# Patient Record
Sex: MALE | Race: WHITE | NOT HISPANIC OR LATINO | Employment: UNEMPLOYED | URBAN - METROPOLITAN AREA
[De-identification: names, ages, dates, MRNs, and addresses within clinical notes are randomized per-mention and may not be internally consistent; named-entity substitution may affect disease eponyms.]

---

## 2019-02-07 ENCOUNTER — OFFICE VISIT (OUTPATIENT)
Dept: FAMILY MEDICINE CLINIC | Facility: CLINIC | Age: 19
End: 2019-02-07

## 2019-02-07 VITALS
OXYGEN SATURATION: 98 % | DIASTOLIC BLOOD PRESSURE: 82 MMHG | HEIGHT: 74 IN | BODY MASS INDEX: 36.06 KG/M2 | HEART RATE: 82 BPM | SYSTOLIC BLOOD PRESSURE: 130 MMHG | WEIGHT: 281 LBS

## 2019-02-07 DIAGNOSIS — Z23 NEED FOR VACCINATION: ICD-10-CM

## 2019-02-07 DIAGNOSIS — Z00.00 PHYSICAL EXAM: Primary | ICD-10-CM

## 2019-02-07 PROCEDURE — 90715 TDAP VACCINE 7 YRS/> IM: CPT | Performed by: NURSE PRACTITIONER

## 2019-02-07 PROCEDURE — 90471 IMMUNIZATION ADMIN: CPT | Performed by: NURSE PRACTITIONER

## 2019-02-07 PROCEDURE — 99385 PREV VISIT NEW AGE 18-39: CPT | Performed by: NURSE PRACTITIONER

## 2019-02-07 RX ORDER — ALBUTEROL SULFATE 90 UG/1
2 AEROSOL, METERED RESPIRATORY (INHALATION)
COMMUNITY
Start: 2019-01-24 | End: 2019-03-11 | Stop reason: SDUPTHER

## 2019-02-07 RX ORDER — FLUTICASONE PROPIONATE 44 UG/1
AEROSOL, METERED RESPIRATORY (INHALATION)
COMMUNITY
End: 2020-08-31 | Stop reason: SDUPTHER

## 2019-02-07 NOTE — PROGRESS NOTES
"Grady Desai is a 19 y.o. male.  PMHX: asthma  PSHX:None  PFHX: none  Diet is mediocre  Exercises 3 times a week  Has a puppy   Sleep hygiene is good  Employed at the [employer]    And is having a baby in 4 mos        Assessment/Plan   Problem List Items Addressed This Visit     None      Visit Diagnoses     Physical exam    -  Primary    Need for vaccination        Relevant Orders    Tdap Vaccine Greater Than or Equal To 6yo IM (Completed)             No Follow-up on file.  There are no Patient Instructions on file for this visit.    Chief Complaint   Patient presents with   • Annual Exam     Social History     Tobacco Use   • Smoking status: Never Smoker   • Smokeless tobacco: Never Used   Substance Use Topics   • Alcohol use: Yes     Comment: occ   • Drug use: No       History of Present Illness     The following portions of the patient's history were reviewed and updated as appropriate:PMHroutine: Social history , Past Medical History, Surgical history , Allergies, Current Medications, Active Problem List, Family History and Health Maintenance    Review of Systems   Constitutional: Negative for activity change and appetite change.   HENT: Negative for congestion.    Respiratory: Negative for cough.    Neurological: Negative for dizziness and headaches.       Objective   Vitals:    02/07/19 0951   BP: 130/82   Pulse: 82   SpO2: 98%   Weight: 127 kg (281 lb)   Height: 188 cm (74\")     Body mass index is 36.08 kg/m².  Physical Exam   Constitutional: He appears well-developed and well-nourished. No distress.   HENT:   Head: Normocephalic and atraumatic.   Right Ear: External ear normal.   Left Ear: External ear normal.   Mouth/Throat: Oropharynx is clear and moist.   Eyes: EOM are normal.   Neck: Neck supple.   Cardiovascular: Normal rate and regular rhythm.   Pulmonary/Chest: Effort normal and breath sounds normal.   Abdominal: Soft. Bowel sounds are normal.   Musculoskeletal: Normal range of motion. "   Neurological: He is alert.   Skin: Skin is warm.   Nursing note and vitals reviewed.    Reviewed Data:  No results found for any previous visit.

## 2019-03-11 ENCOUNTER — TELEPHONE (OUTPATIENT)
Dept: FAMILY MEDICINE CLINIC | Facility: CLINIC | Age: 19
End: 2019-03-11

## 2019-03-11 RX ORDER — ALBUTEROL SULFATE 90 UG/1
2 AEROSOL, METERED RESPIRATORY (INHALATION) EVERY 6 HOURS PRN
Qty: 1 INHALER | Refills: 1 | Status: SHIPPED | OUTPATIENT
Start: 2019-03-11 | End: 2019-05-01 | Stop reason: SDUPTHER

## 2019-03-11 NOTE — TELEPHONE ENCOUNTER
----- Message from Louise Crane sent at 3/11/2019  8:35 AM EDT -----  Patient requesting refill on ProAir called to [facility] [phone]

## 2019-05-01 RX ORDER — ALBUTEROL SULFATE 90 UG/1
2 AEROSOL, METERED RESPIRATORY (INHALATION) EVERY 6 HOURS PRN
Qty: 1 INHALER | Refills: 5 | Status: SHIPPED | OUTPATIENT
Start: 2019-05-01 | End: 2019-07-01 | Stop reason: SDUPTHER

## 2019-05-07 ENCOUNTER — TELEPHONE (OUTPATIENT)
Dept: FAMILY MEDICINE CLINIC | Facility: CLINIC | Age: 19
End: 2019-05-07

## 2019-05-07 NOTE — TELEPHONE ENCOUNTER
----- Message from Louise Crane sent at 5/7/2019 10:05 AM EDT -----  Patient called requesting refill on Pro air Inhaler called to [facility] [phone]

## 2019-07-01 RX ORDER — ALBUTEROL SULFATE 90 UG/1
2 AEROSOL, METERED RESPIRATORY (INHALATION) EVERY 6 HOURS PRN
Qty: 1 INHALER | Refills: 2 | Status: SHIPPED | OUTPATIENT
Start: 2019-07-01 | End: 2019-10-16 | Stop reason: SDUPTHER

## 2019-10-16 RX ORDER — ALBUTEROL SULFATE 90 UG/1
2 AEROSOL, METERED RESPIRATORY (INHALATION) EVERY 6 HOURS PRN
Qty: 1 INHALER | Refills: 2 | Status: SHIPPED | OUTPATIENT
Start: 2019-10-16 | End: 2020-05-18

## 2020-05-18 RX ORDER — ALBUTEROL SULFATE 90 UG/1
AEROSOL, METERED RESPIRATORY (INHALATION)
Qty: 18 INHALER | Refills: 2 | Status: SHIPPED | OUTPATIENT
Start: 2020-05-18 | End: 2020-07-27

## 2020-07-27 RX ORDER — ALBUTEROL SULFATE 90 UG/1
AEROSOL, METERED RESPIRATORY (INHALATION)
Qty: 8.5 INHALER | Refills: 0 | Status: SHIPPED | OUTPATIENT
Start: 2020-07-27 | End: 2020-08-20

## 2020-08-20 RX ORDER — ALBUTEROL SULFATE 90 UG/1
AEROSOL, METERED RESPIRATORY (INHALATION)
Qty: 1 G | Refills: 0 | Status: SHIPPED | OUTPATIENT
Start: 2020-08-20 | End: 2020-08-31 | Stop reason: SDUPTHER

## 2020-08-31 ENCOUNTER — OFFICE VISIT (OUTPATIENT)
Dept: FAMILY MEDICINE CLINIC | Facility: CLINIC | Age: 20
End: 2020-08-31

## 2020-08-31 VITALS
HEART RATE: 91 BPM | OXYGEN SATURATION: 100 % | RESPIRATION RATE: 16 BRPM | HEIGHT: 74 IN | SYSTOLIC BLOOD PRESSURE: 142 MMHG | WEIGHT: 308 LBS | DIASTOLIC BLOOD PRESSURE: 80 MMHG | BODY MASS INDEX: 39.53 KG/M2

## 2020-08-31 DIAGNOSIS — F90.2 ATTENTION DEFICIT HYPERACTIVITY DISORDER (ADHD), COMBINED TYPE: ICD-10-CM

## 2020-08-31 DIAGNOSIS — J45.40 MODERATE PERSISTENT ASTHMA, UNSPECIFIED WHETHER COMPLICATED: ICD-10-CM

## 2020-08-31 DIAGNOSIS — Z00.00 PHYSICAL EXAM: Primary | ICD-10-CM

## 2020-08-31 DIAGNOSIS — F41.9 ANXIETY: ICD-10-CM

## 2020-08-31 PROCEDURE — 99395 PREV VISIT EST AGE 18-39: CPT | Performed by: NURSE PRACTITIONER

## 2020-08-31 RX ORDER — ALBUTEROL SULFATE 90 UG/1
2 AEROSOL, METERED RESPIRATORY (INHALATION) EVERY 6 HOURS PRN
Qty: 1 G | Refills: 2 | Status: SHIPPED | OUTPATIENT
Start: 2020-08-31 | End: 2020-12-07 | Stop reason: SDUPTHER

## 2020-08-31 RX ORDER — FLUTICASONE PROPIONATE 44 UG/1
1 AEROSOL, METERED RESPIRATORY (INHALATION)
Qty: 1 INHALER | Refills: 5 | Status: SHIPPED | OUTPATIENT
Start: 2020-08-31 | End: 2020-12-07 | Stop reason: SDUPTHER

## 2020-08-31 NOTE — PROGRESS NOTES
Preventive Exam    History of Present Illness: Grady Desai is a 20 y.o. here for check up and review of routine health maintenance. he states he is doing well and has no concerns.    Past medical history, surgical history and family history have been reviewed.   PMHX:allergy and asthma,has 2 new complaints ADHD and Anxiety. He has never seen anyone or been medicated. He recently lost his job at [employer] after cutting his left thumb. He is starting [employer] next week.He has trouble sitting still or completion of a task.  Thinks he has some social anxiety.  PSHX:None  PFHX:None    REVIEW OF SYSTEMS  Constitutional: Negative.    HENT: Negative.    Eyes: Negative.    Respiratory: Negative.    Cardiovascular: Negative.    Gastrointestinal: Negative.    Endocrine: Negative.    Genitourinary: Negative.    Musculoskeletal: Negative.  Skin: Negative.    Allergic/Immunologic: Negative.    Neurological: Negative.    Hematological: Negative.    Psychiatric/Behavioral: Negative.      Review of Systems   Constitutional: Negative for activity change, appetite change and fever.   Respiratory: Negative for cough and shortness of breath.    Cardiovascular: Negative for chest pain and leg swelling.   Skin: Negative for rash.       PHYSICAL EXAM    Vitals:    08/31/20 1333   BP: 142/80   Pulse: 91   Resp: 16   SpO2: 100%       GENERAL: alert and oriented, afebrile and vital signs stable  HEENT: oral mucosa moist, PEERLA, EOM, conjunctiva normal  No cervical adenopathy  LUNGS: clear to ascultation bilaterally, no rales, ronchi or wheezing  HEART: RRR S1 S2 without murmers, thrills, rubs or gallops  CHEST WALL: within normal limits, no tenderness  ABDOMEN: WNL. Normal BS.  EXTREMITIES: No clubbing, cyanosis or edema noted. Normal Pulses.  SKIN: warm, dry, no rashes noted  NEURO: CN II- XII grossly intact    Physical Exam   Constitutional: He is oriented to person, place, and time. He appears well-developed and well-nourished.   HENT:    Head: Normocephalic and atraumatic.   Right Ear: External ear normal.   Left Ear: External ear normal.   Mouth/Throat: Oropharynx is clear and moist.   Eyes: Pupils are equal, round, and reactive to light. Conjunctivae and EOM are normal.   Neck: Neck supple.   Cardiovascular: Normal rate and regular rhythm.   Pulmonary/Chest: Effort normal and breath sounds normal. No respiratory distress.   Abdominal: Bowel sounds are normal.   Musculoskeletal: Normal range of motion.   Lymphadenopathy:     He has no cervical adenopathy.   Neurological: He is alert and oriented to person, place, and time.   Skin: Skin is warm and dry.   Psychiatric: He has a normal mood and affect.   Nursing note and vitals reviewed.      Procedures     Grady was seen today for annual exam.    Diagnoses and all orders for this visit:    Physical exam    Moderate persistent asthma, unspecified whether complicated    Anxiety    Attention deficit hyperactivity disorder (ADHD), combined type        Problems Addressed this Visit     None      Visit Diagnoses     Physical exam    -  Primary    Moderate persistent asthma, unspecified whether complicated        Anxiety        Attention deficit hyperactivity disorder (ADHD), combined type          will refer to psychiatry  Gave him a sample of Breo and will have a refill for his albuterol.    Routine health maintenance reviewed and discussed with Grady Giselle.  Preventative counseling for diet and exercise with patient at visit.  Pt reports that they wear their seatbelt regularly.

## 2020-12-07 NOTE — TELEPHONE ENCOUNTER
DELETE AFTER REVIEWING: Telephone encounter to be sent to the clinical pool.  If patient has less than a 3 day supply left, send the encounter HIGH Priority.    Caller: Giselle Grady    Relationship: Self    Best call back number: [phone]     Medication needed:   Requested Prescriptions     Pending Prescriptions Disp Refills   • fluticasone (Flovent HFA) 44 MCG/ACT inhaler 1 inhaler 5     Sig: Inhale 1 puff 2 (Two) Times a Day.   • albuterol sulfate  (90 Base) MCG/ACT inhaler 1 g 2     Sig: Inhale 2 puffs Every 6 (Six) Hours As Needed for Wheezing.       When do you need the refill by: 12/07/20    What details did the patient provide when requesting the medication: PHARMACY STATED THAT THE DOCTOR NEEDED TO SEND OVER A PRESCRIPTION FOR THESE    Does the patient have less than a 3 day supply:  [x] Yes  [] No    What is the patient's preferred pharmacy: [id number] [place] [facility] [address] - [phone]  - [phone] FX

## 2020-12-08 RX ORDER — ALBUTEROL SULFATE 90 UG/1
2 AEROSOL, METERED RESPIRATORY (INHALATION) EVERY 6 HOURS PRN
Qty: 1 G | Refills: 2 | Status: SHIPPED | OUTPATIENT
Start: 2020-12-08 | End: 2021-03-03 | Stop reason: SDUPTHER

## 2020-12-08 RX ORDER — FLUTICASONE PROPIONATE 44 MCG
1 AEROSOL WITH ADAPTER (GRAM) INHALATION
Qty: 1 INHALER | Refills: 5 | Status: SHIPPED | OUTPATIENT
Start: 2020-12-08 | End: 2021-03-16 | Stop reason: SDUPTHER

## 2021-03-03 RX ORDER — ALBUTEROL SULFATE 90 UG/1
2 AEROSOL, METERED RESPIRATORY (INHALATION) EVERY 6 HOURS PRN
Qty: 1 G | Refills: 4 | Status: SHIPPED | OUTPATIENT
Start: 2021-03-03 | End: 2021-07-06

## 2021-03-17 RX ORDER — FLUTICASONE PROPIONATE 44 MCG
1 AEROSOL WITH ADAPTER (GRAM) INHALATION
Qty: 1 EACH | Refills: 2 | Status: SHIPPED | OUTPATIENT
Start: 2021-03-17 | End: 2022-05-25

## 2021-03-23 RX ORDER — ALBUTEROL SULFATE 90 UG/1
2 AEROSOL, METERED RESPIRATORY (INHALATION) EVERY 6 HOURS PRN
Qty: 1 G | Refills: 4 | Status: CANCELLED | OUTPATIENT
Start: 2021-03-23

## 2021-03-23 NOTE — TELEPHONE ENCOUNTER
There must be more than 1 profile for this med on him because we already filled this this month and gave refillis

## 2021-04-20 RX ORDER — ALBUTEROL SULFATE 90 UG/1
2 AEROSOL, METERED RESPIRATORY (INHALATION) EVERY 6 HOURS PRN
Qty: 1 G | Refills: 4 | Status: CANCELLED | OUTPATIENT
Start: 2021-04-20

## 2021-07-06 RX ORDER — ALBUTEROL SULFATE 90 UG/1
AEROSOL, METERED RESPIRATORY (INHALATION)
Qty: 1.8 G | Refills: 4 | Status: SHIPPED | OUTPATIENT
Start: 2021-07-06 | End: 2021-12-13

## 2021-12-13 RX ORDER — ALBUTEROL SULFATE 90 UG/1
AEROSOL, METERED RESPIRATORY (INHALATION)
Qty: 8 G | Refills: 4 | Status: SHIPPED | OUTPATIENT
Start: 2021-12-13 | End: 2022-05-03

## 2022-05-03 RX ORDER — ALBUTEROL SULFATE 90 UG/1
AEROSOL, METERED RESPIRATORY (INHALATION)
Qty: 6.7 G | Refills: 0 | Status: SHIPPED | OUTPATIENT
Start: 2022-05-03 | End: 2022-05-25 | Stop reason: SDUPTHER

## 2022-05-25 ENCOUNTER — OFFICE VISIT (OUTPATIENT)
Dept: FAMILY MEDICINE CLINIC | Facility: CLINIC | Age: 22
End: 2022-05-25

## 2022-05-25 VITALS
SYSTOLIC BLOOD PRESSURE: 124 MMHG | WEIGHT: 315 LBS | HEART RATE: 94 BPM | RESPIRATION RATE: 16 BRPM | OXYGEN SATURATION: 98 % | DIASTOLIC BLOOD PRESSURE: 84 MMHG | BODY MASS INDEX: 40.43 KG/M2 | HEIGHT: 74 IN

## 2022-05-25 DIAGNOSIS — J45.20 MILD INTERMITTENT ASTHMA, UNSPECIFIED WHETHER COMPLICATED: Primary | ICD-10-CM

## 2022-05-25 PROBLEM — J45.909 ASTHMA: Status: ACTIVE | Noted: 2022-05-25

## 2022-05-25 PROCEDURE — 99213 OFFICE O/P EST LOW 20 MIN: CPT | Performed by: NURSE PRACTITIONER

## 2022-05-25 RX ORDER — ALBUTEROL SULFATE 90 UG/1
2 AEROSOL, METERED RESPIRATORY (INHALATION) EVERY 6 HOURS PRN
Qty: 6.7 G | Refills: 1 | Status: SHIPPED | OUTPATIENT
Start: 2022-05-25 | End: 2022-07-27 | Stop reason: SDUPTHER

## 2022-05-25 RX ORDER — DEXTROAMPHETAMINE SULFATE, DEXTROAMPHETAMINE SACCHARATE, AMPHETAMINE SULFATE AND AMPHETAMINE ASPARTATE 5; 5; 5; 5 MG/1; MG/1; MG/1; MG/1
CAPSULE, EXTENDED RELEASE ORAL EVERY MORNING
COMMUNITY
Start: 2022-04-22

## 2022-05-25 NOTE — PROGRESS NOTES
Subjective   Grady Desai is a 22 y.o. male.   Asthma    History of Present Illness   Asthma for past medical history and is having a flare.He does not have an allergist.  He takes an albuterol inhaler and he uses it when he has trouble breathing.  He has pseudo asthma attacks where he has trouble.  Was on Flovent for a while but then it stopped working and he stopped.    He also sees a psychiatrist and takes medicine for his ADHD.    The following portions of the patient's history were reviewed and updated as appropriate: allergies, current medications, past family history, past medical history, past social history, past surgical history and problem list.    Review of Systems   Constitutional: Negative for activity change and appetite change.   HENT: Positive for congestion and sinus pressure.    Respiratory: Positive for cough and shortness of breath. Negative for wheezing.    Neurological: Negative for dizziness and headache.       Objective   Physical Exam  Vitals and nursing note reviewed.   Constitutional:       Appearance: He is well-developed.   HENT:      Head: Normocephalic and atraumatic.   Eyes:      Pupils: Pupils are equal, round, and reactive to light.   Cardiovascular:      Rate and Rhythm: Normal rate and regular rhythm.      Pulses: Normal pulses.      Heart sounds: Normal heart sounds.   Pulmonary:      Effort: Pulmonary effort is normal.      Breath sounds: Normal breath sounds. No wheezing.   Musculoskeletal:         General: Normal range of motion.   Skin:     General: Skin is warm and dry.   Neurological:      Mental Status: He is alert and oriented to person, place, and time.           Assessment & Plan   Problem List Items Addressed This Visit        Pulmonary and Pneumonias    Asthma - Primary        Will return in a month for a physical exam       Return in about 4 weeks (around 6/22/2022) for Annual.

## 2022-06-21 ENCOUNTER — OFFICE VISIT (OUTPATIENT)
Dept: FAMILY MEDICINE CLINIC | Facility: CLINIC | Age: 22
End: 2022-06-21

## 2022-06-21 VITALS
DIASTOLIC BLOOD PRESSURE: 84 MMHG | HEART RATE: 89 BPM | BODY MASS INDEX: 40.43 KG/M2 | OXYGEN SATURATION: 97 % | HEIGHT: 74 IN | RESPIRATION RATE: 14 BRPM | SYSTOLIC BLOOD PRESSURE: 124 MMHG | WEIGHT: 315 LBS

## 2022-06-21 DIAGNOSIS — Z00.00 PHYSICAL EXAM: Primary | ICD-10-CM

## 2022-06-21 DIAGNOSIS — F90.2 ATTENTION DEFICIT HYPERACTIVITY DISORDER (ADHD), COMBINED TYPE: ICD-10-CM

## 2022-06-21 PROCEDURE — 99395 PREV VISIT EST AGE 18-39: CPT | Performed by: NURSE PRACTITIONER

## 2022-06-21 NOTE — PATIENT INSTRUCTIONS
Preventive Care 21-39 Years Old, Male  Preventive care refers to lifestyle choices and visits with your health care provider that can promote health and wellness. This includes:  A yearly physical exam. This is also called an annual wellness visit.  Regular dental and eye exams.  Immunizations.  Screening for certain conditions.  Healthy lifestyle choices, such as:  Eating a healthy diet.  Getting regular exercise.  Not using drugs or products that contain nicotine and tobacco.  Limiting alcohol use.  What can I expect for my preventive care visit?  Physical exam  Your health care provider may check your:  Height and weight. These may be used to calculate your BMI (body mass index). BMI is a measurement that tells if you are at a healthy weight.  Heart rate and blood pressure.  Body temperature.  Skin for abnormal spots.  Counseling  Your health care provider may ask you questions about your:  Past medical problems.  Family's medical history.  Alcohol, tobacco, and drug use.  Emotional well-being.  Home life and relationship well-being.  Sexual activity.  Diet, exercise, and sleep habits.  Work and work environment.  Access to firearms.  What immunizations do I need?    Vaccines are usually given at various ages, according to a schedule. Your health care provider will recommend vaccines for you based on your age, medical history, and lifestyle or other factors, such as travel or where you work.  What tests do I need?  Blood tests  Lipid and cholesterol levels. These may be checked every 5 years starting at age 20.  Hepatitis C test.  Hepatitis B test.  Screening    Diabetes screening. This is done by checking your blood sugar (glucose) after you have not eaten for a while (fasting).  Genital exam to check for testicular cancer or hernias.  STD (sexually transmitted disease) testing, if you are at risk.  Talk with your health care provider about your test results, treatment options, and if necessary, the need for  more tests.  Follow these instructions at home:  Eating and drinking    Eat a healthy diet that includes fresh fruits and vegetables, whole grains, lean protein, and low-fat dairy products.  Drink enough fluid to keep your urine pale yellow.  Take vitamin and mineral supplements as recommended by your health care provider.  Do not drink alcohol if your health care provider tells you not to drink.  If you drink alcohol:  Limit how much you have to 0-2 drinks a day.  Be aware of how much alcohol is in your drink. In the [place] one drink equals one 12 oz bottle of beer (355 mL), one 5 oz glass of wine (148 mL), or one 1½ oz glass of hard liquor (44 mL).    Lifestyle  Take daily care of your teeth and gums. Brush your teeth every morning and night with fluoride toothpaste. Floss one time each day.  Stay active. Exercise for at least 30 minutes 5 or more days each week.  Do not use any products that contain nicotine or tobacco, such as cigarettes, e-cigarettes, and chewing tobacco. If you need help quitting, ask your health care provider.  Do not use drugs.  If you are sexually active, practice safe sex. Use a condom or other form of protection to prevent STIs (sexually transmitted infections).  Find healthy ways to cope with stress, such as:  Meditation, yoga, or listening to music.  Journaling.  Talking to a trusted person.  Spending time with friends and family.  Safety  Always wear your seat belt while driving or riding in a vehicle.  Do not drive:  If you have been drinking alcohol. Do not ride with someone who has been drinking.  When you are tired or distracted.  While texting.  Wear a helmet and other protective equipment during sports activities.  If you have firearms in your house, make sure you follow all gun safety procedures.  Seek help if you have been physically or sexually abused.  What's next?  Go to your health care provider once a year for an annual wellness visit.  Ask your health care provider how  often you should have your eyes and teeth checked.  Stay up to date on all vaccines.  This information is not intended to replace advice given to you by your health care provider. Make sure you discuss any questions you have with your health care provider.  Document Revised: 09/02/2020 Document Reviewed: 12/12/2019  Elsevier Patient Education © 2021 Elsevier Inc.

## 2022-06-21 NOTE — PROGRESS NOTES
Subjective   Grady Desai is a 22 y.o. male.   PMHX:ADHD and is doing well on his adderall.  Allergies and occasionally uses his albuterol.  Has lost 20 lbs.  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    Review of Systems   Constitutional: Negative for activity change, appetite change and fatigue.   HENT: Negative for congestion, ear pain, rhinorrhea and sinus pressure.    Respiratory: Negative for cough.    Cardiovascular: Negative for chest pain.   Neurological: Negative for dizziness and headache.       Objective   Physical Exam  Vitals and nursing note reviewed.   Constitutional:       Appearance: Normal appearance.   HENT:      Head: Normocephalic and atraumatic.      Right Ear: Tympanic membrane normal.      Left Ear: Tympanic membrane normal.      Mouth/Throat:      Mouth: Mucous membranes are moist.   Cardiovascular:      Rate and Rhythm: Normal rate and regular rhythm.      Pulses: Normal pulses.      Heart sounds: Normal heart sounds.   Pulmonary:      Effort: Pulmonary effort is normal.      Breath sounds: Normal breath sounds.   Abdominal:      General: Abdomen is flat.   Musculoskeletal:         General: Normal range of motion.   Skin:     General: Skin is warm and dry.   Neurological:      Mental Status: He is alert and oriented to person, place, and time.           Assessment & Plan   Diagnoses and all orders for this visit:    1. Physical exam (Primary)  -     Comprehensive Metabolic Panel  -     Lipid Panel With LDL / HDL Ratio    2. Attention deficit hyperactivity disorder (ADHD), combined type        Preventative counseling for diet and exercise with patient at visit.  Pt reports that they wear  their seatbelt regularly.

## 2022-06-22 LAB
ALBUMIN SERPL-MCNC: 4.5 G/DL (ref 4.1–5.2)
ALBUMIN/GLOB SERPL: 1.7 {RATIO} (ref 1.2–2.2)
ALP SERPL-CCNC: 72 IU/L (ref 44–121)
ALT SERPL-CCNC: 29 IU/L (ref 0–44)
AST SERPL-CCNC: 24 IU/L (ref 0–40)
BILIRUB SERPL-MCNC: 1.6 MG/DL (ref 0–1.2)
BUN SERPL-MCNC: 13 MG/DL (ref 6–20)
BUN/CREAT SERPL: 13 (ref 9–20)
CALCIUM SERPL-MCNC: 9.4 MG/DL (ref 8.7–10.2)
CHLORIDE SERPL-SCNC: 100 MMOL/L (ref 96–106)
CHOLEST SERPL-MCNC: 215 MG/DL (ref 100–199)
CO2 SERPL-SCNC: 22 MMOL/L (ref 20–29)
CREAT SERPL-MCNC: 0.99 MG/DL (ref 0.76–1.27)
EGFRCR SERPLBLD CKD-EPI 2021: 109 ML/MIN/1.73
GLOBULIN SER CALC-MCNC: 2.7 G/DL (ref 1.5–4.5)
GLUCOSE SERPL-MCNC: 89 MG/DL (ref 65–99)
HDLC SERPL-MCNC: 39 MG/DL
LDLC SERPL CALC-MCNC: 157 MG/DL (ref 0–99)
LDLC/HDLC SERPL: 4 RATIO (ref 0–3.6)
POTASSIUM SERPL-SCNC: 4.3 MMOL/L (ref 3.5–5.2)
PROT SERPL-MCNC: 7.2 G/DL (ref 6–8.5)
SODIUM SERPL-SCNC: 139 MMOL/L (ref 134–144)
TRIGL SERPL-MCNC: 105 MG/DL (ref 0–149)
VLDLC SERPL CALC-MCNC: 19 MG/DL (ref 5–40)

## 2022-07-27 RX ORDER — ALBUTEROL SULFATE 90 UG/1
2 AEROSOL, METERED RESPIRATORY (INHALATION) EVERY 6 HOURS PRN
Qty: 6.7 G | Refills: 1 | Status: SHIPPED | OUTPATIENT
Start: 2022-07-27 | End: 2022-09-19

## 2022-07-27 NOTE — TELEPHONE ENCOUNTER
Important Information about Your Insurance     The Affordable Care Act passed in March 2010 allows for several preventative services, such as colonoscopies, which waives the coinsurance and deductible for the patients. However, there are several caveats that may prevent patients from taking advantage of this provision. Please know that there are strict guidelines on which colonoscopies are defined as a preventative (screening) service. Therefore, some patients with gastrointestinal complaints and/or signs and symptoms may be excluded from taking advantage of the “screening” service at no cost. Co-pays/co-insurance and deductibles may apply.   Our practice has created this document to sort through some these guidelines.   Colonoscopy Categories:   Screening Colonoscopy: Screening is an absence of signs or symptoms and results after the colonoscopy are normal. This patient should be at least 50 years of age and has not undergone a colonoscopy within the last 10 years. No visit prior to a screening colonoscopy is required unless there are signs and/or symptoms or abnormal findings.   Diagnostic Colonoscopy: Diagnostic colonoscopy is performed due to abnormal complaints, signs or symptoms pertaining to lower gastrointestinal issues. This is no longer screening. Patient will be responsible for copayment/coinsurance and deductible.   Therapeutic Colonoscopy: Patient has no signs or symptoms, but polyp(s) is found during a colonoscopy scheduled for screening. This is no longer a screening since a polyp is removed and the patient may or may not be responsible for both copayment/coinsurance and/or deductible. Medicare patient’s will have their deductible waived, but the 20 percent coinsurance is applicable now.   High Risk Screening Colonoscopy: Patient is asymptomatic (no gastrointestinal symptoms presently), has a personal or family history of gastrointestinal disease, colon polyps, and/or cancer.  Last OV: 6/21/2022    Next OV: not scheduled  (not overdue till 6/22/2023)    Last Refill: 5/25/2022   Patients in this category are required to undergo colonoscopy surveillance at shortened intervals (e.g. every 2-5 years from the previous colonoscopy). If the results of the high-risk screening colonoscopy are normal, both the copayment/coinsurance and deductible are waived. During this screening if a polyp is discovered and removed then it becomes a therapeutic colonoscopy.         Your primary care physician may refer you for a “screening” colonoscopy; however, you may not qualify for the “screening” category. This will be determined in the pre-operative process by the Gastroenterologist. Before the procedure, you should know what the patient responsibility is in the event your screening colonoscopy turns into therapeutic colonoscopy during the procedure.   Who will bill me?   You may receive bills from separate entities associated with your procedure, including the physician, facility, anesthesia, pathologist, and/or laboratory. The [facility] Business Office can only provide you with information associated with [facility] fees. Please contact your insurance company if you have any questions. Please contact [facility] regarding billing to verify your coverage and any out of pocket responsibility at [phone]. [phone].   Can the physician change, add, or delete my diagnosis so that I can be considered a colon screening?   No. The patient encounter is documented as a medical record from information you have provided as well as an evaluation and assessment by the physician. It is a binding legal document that cannot be changed to facilitate better insurance coverage.   Patients need to understand that strict government and insurance company documentation and coding guidelines prevent a physician from altering a chart or bill for the sole purpose of coverage determination. This may be considered insurance fraud and may be punishable by law.   What if my insurance company tells  me that the diagnosis code can be changed, added, or deleted?   Often member service representatives will tell a patient that if only the physician coded the claim with a “screening” diagnosis it would have been covered at 100%. However, further questioning of the representative will reveal that the “screening” diagnosis can only be amended if it applies to the patient. If a polyp was removed it is no longer screening.   If you are given this information, please document the date, name, and phone number of the . Often the outcome results in the insurance company calling the patient back explaining that the  should never suggest a physician change their billing to produce better benefit coverage.   If your insurance plan has a high deductible, you may be asked to make a deposit prior to your procedure. For our fees, deposits, or an explanation of this form, please call our Business Office.   REGARDS TO BILLING ISSUES OR BILLING QUESTIONS, please contact your insurance company.     Please contact [facility] regarding billing, to verify your coverage and any out of pocket responsibility at [phone]    I understand the information shared with me today regarding my insurance and responsibility for any potential out-of-pocket costs.

## 2022-07-27 NOTE — TELEPHONE ENCOUNTER
Caller: Grady Desai    Relationship: Self    Best call back number: [phone]    Requested Prescriptions:   Requested Prescriptions     Pending Prescriptions Disp Refills   • albuterol sulfate  (90 Base) MCG/ACT inhaler 6.7 g 1     Sig: Inhale 2 puffs Every 6 (Six) Hours As Needed for Wheezing.        Pharmacy where request should be sent: [facility]/PHARMACY #[id number] - [address] [phone] [facility] [phone] FX     Additional details provided by patient:     Does the patient have less than a 3 day supply:  [x] Yes  [] No    Jax Sears Rep   07/27/22 15:52 EDT

## 2022-08-22 RX ORDER — ALBUTEROL SULFATE 90 UG/1
2 AEROSOL, METERED RESPIRATORY (INHALATION) EVERY 6 HOURS PRN
Qty: 6.7 G | Refills: 1 | OUTPATIENT
Start: 2022-08-22

## 2022-09-19 RX ORDER — ALBUTEROL SULFATE 90 UG/1
2 AEROSOL, METERED RESPIRATORY (INHALATION) EVERY 6 HOURS PRN
Qty: 6.7 G | Refills: 1 | OUTPATIENT
Start: 2022-09-19

## 2022-09-19 RX ORDER — ALBUTEROL SULFATE 90 UG/1
AEROSOL, METERED RESPIRATORY (INHALATION)
Qty: 6.7 G | Refills: 2 | Status: SHIPPED | OUTPATIENT
Start: 2022-09-19

## 2023-06-01 ENCOUNTER — HOSPITAL ENCOUNTER (EMERGENCY)
Facility: HOSPITAL | Age: 23
Discharge: HOME | End: 2023-06-01
Attending: EMERGENCY MEDICINE
Payer: COMMERCIAL

## 2023-06-01 ENCOUNTER — APPOINTMENT (OUTPATIENT)
Dept: GENERAL RADIOLOGY | Facility: HOSPITAL | Age: 23
End: 2023-06-01
Payer: COMMERCIAL

## 2023-06-01 VITALS
SYSTOLIC BLOOD PRESSURE: 131 MMHG | DIASTOLIC BLOOD PRESSURE: 87 MMHG | TEMPERATURE: 98 F | RESPIRATION RATE: 20 BRPM | OXYGEN SATURATION: 93 % | BODY MASS INDEX: 39.78 KG/M2 | HEIGHT: 74 IN | HEART RATE: 68 BPM | WEIGHT: 310 LBS

## 2023-06-01 DIAGNOSIS — R07.89 ATYPICAL CHEST PAIN: ICD-10-CM

## 2023-06-01 DIAGNOSIS — R06.02 SHORTNESS OF BREATH: Primary | ICD-10-CM

## 2023-06-01 LAB
ALBUMIN SERPL-MCNC: 4.3 G/DL (ref 3.5–5.2)
ALBUMIN/GLOB SERPL: 1.3 G/DL
ALP SERPL-CCNC: 63 U/L (ref 39–117)
ALT SERPL W P-5'-P-CCNC: 47 U/L (ref 1–41)
ANION GAP SERPL CALCULATED.3IONS-SCNC: 7.3 MMOL/L (ref 5–15)
AST SERPL-CCNC: 21 U/L (ref 1–40)
BASOPHILS # BLD AUTO: 0.09 10*3/MM3 (ref 0–0.2)
BASOPHILS NFR BLD AUTO: 0.5 % (ref 0–1.5)
BILIRUB SERPL-MCNC: 0.6 MG/DL (ref 0–1.2)
BUN SERPL-MCNC: 16 MG/DL (ref 6–20)
BUN/CREAT SERPL: 17.2 (ref 7–25)
CALCIUM SPEC-SCNC: 9.6 MG/DL (ref 8.6–10.5)
CHLORIDE SERPL-SCNC: 104 MMOL/L (ref 98–107)
CO2 SERPL-SCNC: 27.7 MMOL/L (ref 22–29)
CREAT SERPL-MCNC: 0.93 MG/DL (ref 0.76–1.27)
D DIMER PPP FEU-MCNC: <0.27 MCGFEU/ML (ref 0–0.5)
DEPRECATED RDW RBC AUTO: 40.7 FL (ref 37–54)
EGFRCR SERPLBLD CKD-EPI 2021: 116.9 ML/MIN/1.73
EOSINOPHIL # BLD AUTO: 0.16 10*3/MM3 (ref 0–0.4)
EOSINOPHIL NFR BLD AUTO: 0.8 % (ref 0.3–6.2)
ERYTHROCYTE [DISTWIDTH] IN BLOOD BY AUTOMATED COUNT: 12.7 % (ref 12.3–15.4)
GEN 5 2HR TROPONIN T REFLEX: <6 NG/L
GLOBULIN UR ELPH-MCNC: 3.2 GM/DL
GLUCOSE SERPL-MCNC: 114 MG/DL (ref 65–99)
HCT VFR BLD AUTO: 45.2 % (ref 37.5–51)
HGB BLD-MCNC: 15.5 G/DL (ref 13–17.7)
HOLD SPECIMEN: NORMAL
HOLD SPECIMEN: NORMAL
IMM GRANULOCYTES # BLD AUTO: 0.24 10*3/MM3 (ref 0–0.05)
IMM GRANULOCYTES NFR BLD AUTO: 1.3 % (ref 0–0.5)
LYMPHOCYTES # BLD AUTO: 2.46 10*3/MM3 (ref 0.7–3.1)
LYMPHOCYTES NFR BLD AUTO: 12.8 % (ref 19.6–45.3)
MCH RBC QN AUTO: 30.3 PG (ref 26.6–33)
MCHC RBC AUTO-ENTMCNC: 34.3 G/DL (ref 31.5–35.7)
MCV RBC AUTO: 88.3 FL (ref 79–97)
MONOCYTES # BLD AUTO: 1.25 10*3/MM3 (ref 0.1–0.9)
MONOCYTES NFR BLD AUTO: 6.5 % (ref 5–12)
NEUTROPHILS NFR BLD AUTO: 14.99 10*3/MM3 (ref 1.7–7)
NEUTROPHILS NFR BLD AUTO: 78.1 % (ref 42.7–76)
NRBC BLD AUTO-RTO: 0 /100 WBC (ref 0–0.2)
PLATELET # BLD AUTO: 313 10*3/MM3 (ref 140–450)
PMV BLD AUTO: 10.8 FL (ref 6–12)
POTASSIUM SERPL-SCNC: 4.2 MMOL/L (ref 3.5–5.2)
PROT SERPL-MCNC: 7.5 G/DL (ref 6–8.5)
RBC # BLD AUTO: 5.12 10*6/MM3 (ref 4.14–5.8)
SODIUM SERPL-SCNC: 139 MMOL/L (ref 136–145)
TROPONIN T DELTA: NORMAL
TROPONIN T SERPL HS-MCNC: <6 NG/L
WBC NRBC COR # BLD: 19.19 10*3/MM3 (ref 3.4–10.8)
WHOLE BLOOD HOLD COAG: NORMAL
WHOLE BLOOD HOLD SPECIMEN: NORMAL

## 2023-06-01 PROCEDURE — 93005 ELECTROCARDIOGRAM TRACING: CPT

## 2023-06-01 PROCEDURE — 84484 ASSAY OF TROPONIN QUANT: CPT

## 2023-06-01 PROCEDURE — 85025 COMPLETE CBC W/AUTO DIFF WBC: CPT

## 2023-06-01 PROCEDURE — 84484 ASSAY OF TROPONIN QUANT: CPT | Performed by: EMERGENCY MEDICINE

## 2023-06-01 PROCEDURE — 71046 X-RAY EXAM CHEST 2 VIEWS: CPT

## 2023-06-01 PROCEDURE — 80053 COMPREHEN METABOLIC PANEL: CPT

## 2023-06-01 PROCEDURE — 85379 FIBRIN DEGRADATION QUANT: CPT | Performed by: NURSE PRACTITIONER

## 2023-06-01 PROCEDURE — 99283 EMERGENCY DEPT VISIT LOW MDM: CPT

## 2023-06-01 PROCEDURE — 36415 COLL VENOUS BLD VENIPUNCTURE: CPT

## 2023-06-01 PROCEDURE — 93005 ELECTROCARDIOGRAM TRACING: CPT | Performed by: EMERGENCY MEDICINE

## 2023-06-01 RX ORDER — SODIUM CHLORIDE 0.9 % (FLUSH) 0.9 %
10 SYRINGE (ML) INJECTION AS NEEDED
Status: DISCONTINUED | OUTPATIENT
Start: 2023-06-01 | End: 2023-06-01 | Stop reason: HOSPADM

## 2023-06-01 NOTE — ED NOTES
Patient c/o shortness of breath that started two week ago. Hx of asthma. Today started having intermittent chest pain that radiates down to left arm.

## 2023-06-01 NOTE — ED PROVIDER NOTES
The ORTIZ and I have discussed this patient's history, physical exam and treatment plan.  I provided a substantive portion of the care of this patient.  I have reviewed the documentation and personally had a face to face interaction with the patient and personally performed the physical exam, in its entirety.  I affirm the documentation and agree with the treatment and plan.  The following describes my personal findings.      The patient presents complaining of shortness of air which she describes as feeling the need to take deep breaths intermittently over the past 2 weeks.  Patient reports at 1230 he had onset of sharp focal left anterior chest discomfort lasting 30 seconds and a subsequent episode of left AC sharp discomfort lasting less than 1 minute, resolved.  Patient denies sweating, nausea, vomiting, recent fevers.  Patient denies swelling of extremities.  Patient reports he is non-smoker, takes propranolol for anxiety/blood pressure, denies history of diabetes and reports he controls his cholesterol with his diet.    Patient denies family history of coronary artery disease.    Comprehensive Physical exam:  Patient is nontoxic appearing oriented, conversant awake, alert  HEENT: normocephalic, atraumatic  Neck: No JVD, no goiter, no pain with ROM  Pulmonary: Nontachypneic, breath sounds are well bilaterally  cardiovascular: Nontachycardic  Abdomen: Soft, nontender  musculoskeletal: Good range of motion x4, no edema in lower extremities  Neuro/psychiatric:calm, appropriate, cooperative  Skin:warm, dry    Heart score 2    EKG          EKG time: 1527  Rhythm/Rate: Sinus rhythm, rate in the 70s  P waves and TN: Normal P waves, normal IDA's  QRS, axis: Unremarkable  ST and T waves: Unremarkable    Interpreted Contemporaneously by me, independently viewed  No old for comparison    Patient was wearing facemask when I entered the room and throughout our encounter. Full protective equipment was worn throughout this  patient encounter including a face mask, eye protection and gloves. Hand hygiene was performed before donning protective equipment and after removal when leaving the room.           Bertha Tinoco MD  06/01/23 1915

## 2023-06-01 NOTE — DISCHARGE INSTRUCTIONS
Although you are being discharged from the ED today, I encourage you to return for worsening symptoms. Things can, and do, change such that treatment at home with medication may not be adequate. Specifically I recommend returning for chest pain or discomfort, difficulty breathing, persistent vomiting or difficulty holding down liquids or medications, fever > 102.0 F,  or any other worsening or alarming symptoms.     Rest. Drink plenty of fluids.  Follow up with PCP or provider listed for further evaluation and management.  Follow up with primary care provider for further management and to have blood pressure rechecked.  Take all medications as prescribed.    Can consider adding Zyrtec or Claritin

## 2023-06-01 NOTE — ED PROVIDER NOTES
"        EMERGENCY DEPARTMENT ENCOUNTER    Room Number:  04/04  Date seen:  6/1/2023  Time seen: 16:53 EDT  PCP: Janki Mckeon APRN  Historian: patient, significant other    HPI:  Chief complaint:chest pain, left arm pain  A complete HPI/ROS/PMH/PSH/SH/FH are unobtainable due to: n/a  Context:Grady Desai is a 23 y.o. male with past medical history of asthma on symbicort, albuterol and singular who presents to the ED with c/o 2 weeks of having to take deep frequent breaths to get a \"satisfying deep breath\".  He suspects it was due to asthma flare and saw PCP who added Singular.  Today, he complains of left arm pain and chest pain described as a pressure sensation.  It is intermittent and not made better/worse by anything.  He has not had this problem before.  He did recently complete a round of prednisone but feels no improvement.  He is not a smoker, has no h/o blood clots or recent travel.       Social determinants of health which may impact assessment: n/a    Review of prior external notes (non-ED):n/a    Review of prior external test results outside of this encounter: n/a    ALLERGIES  Patient has no known allergies.    PAST MEDICAL HISTORY  Active Ambulatory Problems     Diagnosis Date Noted   • Asthma 05/25/2022     Resolved Ambulatory Problems     Diagnosis Date Noted   • No Resolved Ambulatory Problems     No Additional Past Medical History       PAST SURGICAL HISTORY  No past surgical history on file.    FAMILY HISTORY  Family History   Problem Relation Age of Onset   • No Known Problems Mother    • No Known Problems Father    • No Known Problems Maternal Grandmother    • No Known Problems Maternal Grandfather    • No Known Problems Paternal Grandmother    • No Known Problems Paternal Grandfather        SOCIAL HISTORY  Social History     Socioeconomic History   • Marital status:    Tobacco Use   • Smoking status: Never   • Smokeless tobacco: Never   Substance and Sexual Activity   • Alcohol use: " Yes     Comment: occ   • Drug use: No       REVIEW OF SYSTEMS  Review of Systems    All systems reviewed and negative except for those discussed in HPI.     PHYSICAL EXAM    I have reviewed the triage vital signs and nursing notes.  Vitals:    06/01/23 1700   BP: 135/83   Pulse: 75   Resp:    Temp:    SpO2: 96%     Physical Exam    GENERAL: not distressed  HENT: nares patent  EYES: no scleral icterus  NECK: no ROM limitations  CV: regular rhythm, regular rate, no murmur  RESPIRATORY: normal effort, no wheezing or stridor  ABDOMEN: soft  : deferred  MUSCULOSKELETAL: no deformity  NEURO: alert, moves all extremities, follows commands  SKIN: warm, dry    LAB RESULTS  Recent Results (from the past 24 hour(s))   ECG 12 Lead Chest Pain    Collection Time: 06/01/23  3:27 PM   Result Value Ref Range    QT Interval 364 ms   Comprehensive Metabolic Panel    Collection Time: 06/01/23  3:39 PM    Specimen: Blood   Result Value Ref Range    Glucose 114 (H) 65 - 99 mg/dL    BUN 16 6 - 20 mg/dL    Creatinine 0.93 0.76 - 1.27 mg/dL    Sodium 139 136 - 145 mmol/L    Potassium 4.2 3.5 - 5.2 mmol/L    Chloride 104 98 - 107 mmol/L    CO2 27.7 22.0 - 29.0 mmol/L    Calcium 9.6 8.6 - 10.5 mg/dL    Total Protein 7.5 6.0 - 8.5 g/dL    Albumin 4.3 3.5 - 5.2 g/dL    ALT (SGPT) 47 (H) 1 - 41 U/L    AST (SGOT) 21 1 - 40 U/L    Alkaline Phosphatase 63 39 - 117 U/L    Total Bilirubin 0.6 0.0 - 1.2 mg/dL    Globulin 3.2 gm/dL    A/G Ratio 1.3 g/dL    BUN/Creatinine Ratio 17.2 7.0 - 25.0    Anion Gap 7.3 5.0 - 15.0 mmol/L    eGFR 116.9 >60.0 mL/min/1.73   High Sensitivity Troponin T    Collection Time: 06/01/23  3:39 PM    Specimen: Blood   Result Value Ref Range    HS Troponin T <6 <15 ng/L   Green Top (Gel)    Collection Time: 06/01/23  3:39 PM   Result Value Ref Range    Extra Tube Hold for add-ons.    Lavender Top    Collection Time: 06/01/23  3:39 PM   Result Value Ref Range    Extra Tube hold for add-on    Light Blue Top    Collection  Time: 06/01/23  3:39 PM   Result Value Ref Range    Extra Tube Hold for add-ons.    CBC Auto Differential    Collection Time: 06/01/23  3:39 PM    Specimen: Blood   Result Value Ref Range    WBC 19.19 (H) 3.40 - 10.80 10*3/mm3    RBC 5.12 4.14 - 5.80 10*6/mm3    Hemoglobin 15.5 13.0 - 17.7 g/dL    Hematocrit 45.2 37.5 - 51.0 %    MCV 88.3 79.0 - 97.0 fL    MCH 30.3 26.6 - 33.0 pg    MCHC 34.3 31.5 - 35.7 g/dL    RDW 12.7 12.3 - 15.4 %    RDW-SD 40.7 37.0 - 54.0 fl    MPV 10.8 6.0 - 12.0 fL    Platelets 313 140 - 450 10*3/mm3    Neutrophil % 78.1 (H) 42.7 - 76.0 %    Lymphocyte % 12.8 (L) 19.6 - 45.3 %    Monocyte % 6.5 5.0 - 12.0 %    Eosinophil % 0.8 0.3 - 6.2 %    Basophil % 0.5 0.0 - 1.5 %    Immature Grans % 1.3 (H) 0.0 - 0.5 %    Neutrophils, Absolute 14.99 (H) 1.70 - 7.00 10*3/mm3    Lymphocytes, Absolute 2.46 0.70 - 3.10 10*3/mm3    Monocytes, Absolute 1.25 (H) 0.10 - 0.90 10*3/mm3    Eosinophils, Absolute 0.16 0.00 - 0.40 10*3/mm3    Basophils, Absolute 0.09 0.00 - 0.20 10*3/mm3    Immature Grans, Absolute 0.24 (H) 0.00 - 0.05 10*3/mm3    nRBC 0.0 0.0 - 0.2 /100 WBC   D-dimer, Quantitative    Collection Time: 06/01/23  3:39 PM    Specimen: Blood   Result Value Ref Range    D-Dimer, Quantitative <0.27 0.00 - 0.50 MCGFEU/mL   Gold Top - SST    Collection Time: 06/01/23  5:44 PM   Result Value Ref Range    Extra Tube Hold for add-ons.    High Sensitivity Troponin T 2Hr    Collection Time: 06/01/23  5:44 PM    Specimen: Blood   Result Value Ref Range    HS Troponin T <6 <15 ng/L    Troponin T Delta         Ordered the above labs and independently interpreted results.  My findings will be discussed in the ED course or medical decision making section below    RADIOLOGY RESULTS  XR Chest 2 View    Result Date: 6/1/2023  XR CHEST 2 VW-  HISTORY: Male who is 23 years-old,  short of breath  TECHNIQUE: Frontal and lateral views of the chest  COMPARISON: None available  FINDINGS: Heart, mediastinum and pulmonary  vasculature are unremarkable. No focal pulmonary consolidation, pleural effusion, or pneumothorax. No acute osseous process.      No evidence for acute pulmonary process. Follow-up as clinical indications persist.  This report was finalized on 6/1/2023 4:05 PM by Dr. Speedy Suarez M.D.         Ordered the above noted radiological studies.  Independently interpreted by me .  My findings will be discussed in the medical decision section below.     PROGRESS, DATA ANALYSIS, CONSULTS AND MEDICAL DECISION MAKING    Please note that this section constitutes my independent interpretation of clinical data including lab results, radiology, EKG's.  This constitutes my independent professional opinion regarding differential diagnosis and management of this patient.  It may include any factors such as history from outside sources, review of external records, social determinants of health, management of medications, response to those treatments, and discussions with other providers.      ED Course as of 06/01/23 1908   Thu Jun 01, 2023   1720 Viewed chest x-ray in PACS per minute interpretation is no acute infiltrate [EW]   1722 WBC(!): 19.19  Recent steroids   [EW]   1750 EKG          EKG time: 1527  Rhythm/Rate: 78, sinus rhythm  P waves and ID: normal ID, normal IDA  QRS, axis: normal QRS and axis  ST and T waves: no acute ST elevation    Interpreted Contemporaneously by me, independently viewed  No prior available for comparison   [EW]   1903 MDM/dispo:  Pt has reassuring physical exam, he is not wheezing.  He has no signs on exam of covid and has had 2 negative home tests.  He is not hypoxic, tachycardic.  Dimer and HS trop WNL.  CXR without infiltrates.  He has started Singular and added symbicort.  Discussed plan for discharge with patient.  He is agreeable to plan. I have advised to start Zyrtec or Claritin daily.  [EW]      ED Course User Index  [EW] Alie Freeman APRN       Orders placed during this  visit:  Orders Placed This Encounter   Procedures   • XR Chest 2 View   • [place] Draw   • Comprehensive Metabolic Panel   • High Sensitivity Troponin T   • CBC Auto Differential   • High Sensitivity Troponin T 2Hr   • D-dimer, Quantitative   • NPO Diet NPO Type: Strict NPO   • Undress and Gown   • Continuous Pulse Oximetry   • Oxygen Therapy- Nasal Cannula; Titrate 1-6 LPM Per SpO2; 90 - 95%   • ECG 12 Lead Chest Pain   • ECG 12 Lead ED Triage Standing Order; Chest Pain   • Insert Peripheral IV   • CBC & Differential   • Green Top (Gel)   • Lavender Top   • Gold Top - SST   • Light Blue Top           Reviewed pt's history and workup with Dr. Tinoco.  After a bedside evaluation, Dr. Tinoco agrees with the plan of care.    The patient's history, physical exam, and lab findings were discussed with the physician, who also performed a face to face history and physical exam.  I discussed all results and noted any abnormalities with patient.  Discussed absoute need to recheck abnormalities with their family physician.  I answered any of the patient's questions.  Discussed plan for discharge, as there is no emergent indication for admission.  Pt is agreeable and understands need for follow up and repeat testing.  Pt is aware that discharge does not mean that nothing is wrong but it indicates no emergency is present and they must continue care with their family physician.  Pt is discharged with instructions to follow up with primary care doctor to have their blood pressure rechecked.     DIAGNOSIS  Final diagnoses:   Shortness of breath       FOLLOW-UP  Janki Mckeon, APRN  [address] [facility] [place] [address]  [phone]    Schedule an appointment as soon as possible for a visit in 1 week          Latest Documented Vital Signs:  As of 19:08 EDT  BP- 135/83 HR- 75 Temp- 98 °F (36.7 °C) O2 sat- 96%    Appropriate PPE utilized throughout this patient encounter to include mask, if indicated, per current protocol. Hand  hygiene was performed before donning PPE and after removal when leaving the room.    Please note that portions of this were completed with a voice recognition program.     Note Disclaimer: At [facility], we believe that sharing information builds trust and better relationships. You are receiving this note because you are receiving care at [facility] or recently visited. It is possible you will see health information before a provider has talked with you about it. This kind of information can be easy to misunderstand. To help you fully understand what it means for your health, we urge you to discuss this note with your provider.                 Alie Freeman, APRN  06/01/23 9813

## 2023-06-02 LAB — QT INTERVAL: 364 MS

## 2023-12-12 ENCOUNTER — HOSPITAL ENCOUNTER (EMERGENCY)
Facility: HOSPITAL | Age: 23
Discharge: HOME | End: 2023-12-12
Attending: EMERGENCY MEDICINE | Admitting: EMERGENCY MEDICINE
Payer: COMMERCIAL

## 2023-12-12 ENCOUNTER — APPOINTMENT (OUTPATIENT)
Dept: GENERAL RADIOLOGY | Facility: HOSPITAL | Age: 23
End: 2023-12-12
Payer: COMMERCIAL

## 2023-12-12 VITALS
RESPIRATION RATE: 16 BRPM | DIASTOLIC BLOOD PRESSURE: 91 MMHG | HEART RATE: 79 BPM | SYSTOLIC BLOOD PRESSURE: 130 MMHG | BODY MASS INDEX: 40.43 KG/M2 | OXYGEN SATURATION: 98 % | WEIGHT: 315 LBS | TEMPERATURE: 96.9 F | HEIGHT: 74 IN

## 2023-12-12 DIAGNOSIS — S86.011A STRAIN OF ACHILLES TENDON, RIGHT, INITIAL ENCOUNTER: Primary | ICD-10-CM

## 2023-12-12 PROCEDURE — 73610 X-RAY EXAM OF ANKLE: CPT

## 2023-12-12 PROCEDURE — 99283 EMERGENCY DEPT VISIT LOW MDM: CPT

## 2023-12-12 NOTE — DISCHARGE INSTRUCTIONS
Ice, anti-inflammatories, outpatient orthopedic follow-up, ED return for worsening symptoms as needed.

## 2023-12-12 NOTE — ED PROVIDER NOTES
EMERGENCY DEPARTMENT ENCOUNTER    Room Number:  09/09  PCP: Janki Mckeon APRN  Historian: Patient      HPI:  Chief Complaint: Right heel pain  A complete HPI/ROS/PMH/PSH/SH/FH are unobtainable due to: None    Context: Grady Desai is a 23 y.o. male who presents to the ED via private vehicle for evaluation for several days of progressive pain and stiffness in his right heel/ankle region.  He initially stretched it and strained it a couple days ago when stepping off of a curb.  Progressively having more pain and difficulty ambulating due to the pain.        MEDICAL RECORD REVIEW    External (non-ED) record review: No prior imaging on the right ankle    PAST MEDICAL HISTORY  Active Ambulatory Problems     Diagnosis Date Noted    Asthma 05/25/2022     Resolved Ambulatory Problems     Diagnosis Date Noted    No Resolved Ambulatory Problems     No Additional Past Medical History         PAST SURGICAL HISTORY  No past surgical history on file.      FAMILY HISTORY  Family History   Problem Relation Age of Onset    No Known Problems Mother     No Known Problems Father     No Known Problems Maternal Grandmother     No Known Problems Maternal Grandfather     No Known Problems Paternal Grandmother     No Known Problems Paternal Grandfather          SOCIAL HISTORY  Social History     Socioeconomic History    Marital status:    Tobacco Use    Smoking status: Never    Smokeless tobacco: Never   Substance and Sexual Activity    Alcohol use: Yes     Comment: occ    Drug use: No         ALLERGIES  Patient has no known allergies.        REVIEW OF SYSTEMS  Review of Systems     All systems reviewed and negative except for those discussed in HPI.       PHYSICAL EXAM    I have reviewed the triage vital signs and nursing notes.    ED Triage Vitals   Temp Heart Rate Resp BP SpO2   12/12/23 0728 12/12/23 0727 12/12/23 0727 12/12/23 0730 12/12/23 0727   96.9 °F (36.1 °C) 79 16 130/91 98 %      Temp src Heart Rate Source Patient  Position BP Location FiO2 (%)   12/12/23 0728 -- -- -- --   Tympanic           Physical Exam  General: No acute distress, nontoxic  HEENT: EOMI  Pulm: Symmetric chest rise, nonlabored breathing  CV: Regular rate and rhythm  GI: Nondistended  MSK: No deformity, mild tenderness at the insertion of the Achilles tendon, intact plantar and dorsiflexion  Skin: Warm, dry  Neuro: Awake, alert, oriented x 4, moving all extremities, no focal deficits  Psych: Calm, cooperative    Vital signs and nursing notes reviewed.         LAB RESULTS  No results found for this or any previous visit (from the past 24 hour(s)).    Ordered the above labs and independently interpreted results. My findings will be discussed in the medical decision making section below        RADIOLOGY  XR Ankle 3+ View Right    Result Date: 12/12/2023  XR ANKLE 3+ VW RIGHT-  INDICATION: Ankle pain  COMPARISON: None available      No fracture. Ankle mortise is maintained on nonweightbearing views. Os trigonum.  This report was finalized on 12/12/2023 8:44 AM by Dr. Guillermo Maria M.D on Workstation: [id number]       Ordered the above noted radiological studies.  Independently interpreted by me and my independent review of findings can be found in the ED Course.  See dictation for official radiology interpretation.      PROCEDURES    Procedures      MEDICATIONS GIVEN IN ER    Medications - No data to display      PROGRESS, DATA ANALYSIS, CONSULTS, AND MEDICAL DECISION MAKING    Please note that this section constitutes my independent interpretation of clinical data including lab results, radiology, EKG's.  This constitutes my independent professional opinion regarding differential diagnosis and management of this patient.  It may include any factors such as history from outside sources, review of external records, social determinants of health, management of medications, response to those treatments, and discussions with other providers.    Differential  Diagnosis and Plan: Initial concern for Achilles strain, partial tear, lower concern for complete tear, avulsion fracture of the ankle, among others.  Plan for x-ray and reevaluation with results.    Additional sources:  - Discussed/ obtained information from independent historians:       - Chronic or social conditions impacting care:      - Shared decision making:  Patient and wife at bedside fully updated on and in agreement with the course and plan moving forward    ED Course as of 12/12/23 0908   Tue Dec 12, 2023   0852 XR Ankle 3+ View Right  Per my independent interpretation of the ankle x-ray, no overt fracture or dislocation noted [DC]   0901 XR Ankle 3+ View Right  Radiology report reviewed, no evidence of acute fracture [DC]   0901 At this time suspect more of an Achilles strain although a partial tear can certainly be present.  I do not have high suspicion for complete rupture of the Achilles tendon given exam at this point.  I will plan for walking boot, crutches, supportive care with ice, heat, anti-inflammatories, and outpatient podiatry/orthopedic follow-up. [DC]      ED Course User Index  [DC] Robbie Greenberg MD       Hospitalization Considered?:     Orders Placed During This Visit:  Orders Placed This Encounter   Procedures    [facility] 08.  CAM Boot, 11.  Crutches; Yes; Yes; achilles injury; Yes; Heightened Risk of Morbidity / Mortality Secondary to Attempts to Perform MRADLs; Able to Safely Use Equipment; Mobility Deficit Can Be Sufficiently Resolved ...    XR Ankle 3+ View Right       Additional orders considered but not placed:      Independent interpretation of labs, radiology studies, and discussions with consultants: See ED Course        AS OF 09:08 EST VITALS:    BP - 130/91  HR - 79  TEMP - 96.9 °F (36.1 °C) (Tympanic)  02 SATS - 98%        DIAGNOSIS  Final diagnoses:   Strain of Achilles tendon, right, initial encounter         DISPOSITION  DISCHARGE    Patient discharged  in stable condition.    Reviewed implications of results, diagnosis, meds, responsibility to follow up, warning signs and symptoms of possible worsening, potential complications and reasons to return to ER. If your blood pressure > 120/80 please follow up with your primary care provider for further management.    Patient/Family voiced understanding of above instructions.    Discussed plan for discharge, as there is no emergent indication for admission. Pt/family is agreeable and understands need for follow up and repeat testing.  Pt is aware that discharge does not mean that nothing is wrong but it indicates no emergency is present that requires admission and they must continue care with follow-up as given below or physician of their choice.     FOLLOW-UP  [facility] EMERGENCY DEPARTMENT  [address] [facility] [address] [phone]    As needed, If symptoms worsen    Grady Angeles Jr., MD  [address] [id number] [phone]    Schedule an appointment as soon as possible for a visit            Medication List      No changes were made to your prescriptions during this visit.                       --    Please note that portions of this were completed with a voice recognition program.       Note Disclaimer: At [facility], we believe that sharing information builds trust and better relationships. You are receiving this note because you are receiving care at [facility] or recently visited. It is possible you will see health information before a provider has talked with you about it. This kind of information can be easy to misunderstand. To help you fully understand what it means for your health, we urge you to discuss this note with your provider.           Robbie Greenberg MD  12/13/23 1615

## 2023-12-12 NOTE — Clinical Note
[facility] EMERGENCY DEPARTMENT  [address] [facility] [address]  Phone: [phone]    Grady Desai was seen and treated in our emergency department on 12/12/2023.  He may return to work on 12/15/2023.         Thank you for choosing [facility].    Robibe Greenberg MD

## 2024-01-16 DIAGNOSIS — J45.909 ASTHMA: ICD-10-CM

## 2024-01-16 DIAGNOSIS — J45.909 UNSPECIFIED ASTHMA, UNCOMPLICATED: ICD-10-CM

## 2024-01-16 RX ORDER — ALBUTEROL SULFATE 90 UG/1
AEROSOL, METERED RESPIRATORY (INHALATION)
OUTPATIENT
Start: 2024-01-16

## 2024-10-29 ENCOUNTER — APPOINTMENT (OUTPATIENT)
Dept: GENERAL RADIOLOGY | Facility: HOSPITAL | Age: 24
End: 2024-10-29
Payer: COMMERCIAL

## 2024-10-29 ENCOUNTER — HOSPITAL ENCOUNTER (EMERGENCY)
Facility: HOSPITAL | Age: 24
Discharge: HOME | End: 2024-10-29
Attending: EMERGENCY MEDICINE | Admitting: EMERGENCY MEDICINE
Payer: COMMERCIAL

## 2024-10-29 VITALS
SYSTOLIC BLOOD PRESSURE: 121 MMHG | RESPIRATION RATE: 16 BRPM | WEIGHT: 310 LBS | HEART RATE: 70 BPM | BODY MASS INDEX: 39.78 KG/M2 | DIASTOLIC BLOOD PRESSURE: 78 MMHG | HEIGHT: 74 IN | TEMPERATURE: 98 F | OXYGEN SATURATION: 95 %

## 2024-10-29 DIAGNOSIS — M54.50 ACUTE RIGHT-SIDED LOW BACK PAIN WITHOUT SCIATICA: ICD-10-CM

## 2024-10-29 DIAGNOSIS — S39.012A STRAIN OF LUMBAR REGION, INITIAL ENCOUNTER: Primary | ICD-10-CM

## 2024-10-29 PROCEDURE — 99283 EMERGENCY DEPT VISIT LOW MDM: CPT

## 2024-10-29 PROCEDURE — 72110 X-RAY EXAM L-2 SPINE 4/>VWS: CPT

## 2024-10-29 RX ORDER — METHOCARBAMOL 750 MG/1
750 TABLET, FILM COATED ORAL 3 TIMES DAILY PRN
Qty: 15 TABLET | Refills: 0 | Status: SHIPPED | OUTPATIENT
Start: 2024-10-29

## 2024-10-29 RX ORDER — LIDOCAINE 4 G/G
1 PATCH TOPICAL ONCE
Status: DISCONTINUED | OUTPATIENT
Start: 2024-10-29 | End: 2024-10-29 | Stop reason: HOSPADM

## 2024-10-29 RX ORDER — HYDROCODONE BITARTRATE AND ACETAMINOPHEN 5; 325 MG/1; MG/1
1 TABLET ORAL ONCE
Status: COMPLETED | OUTPATIENT
Start: 2024-10-29 | End: 2024-10-29

## 2024-10-29 RX ORDER — DICLOFENAC SODIUM 75 MG/1
75 TABLET, DELAYED RELEASE ORAL 2 TIMES DAILY
Qty: 20 TABLET | Refills: 0 | Status: SHIPPED | OUTPATIENT
Start: 2024-10-29

## 2024-10-29 RX ADMIN — HYDROCODONE BITARTRATE AND ACETAMINOPHEN 1 TABLET: 5; 325 TABLET ORAL at 09:26

## 2024-10-29 RX ADMIN — LIDOCAINE 1 PATCH: 4 PATCH TOPICAL at 09:27

## 2024-10-29 NOTE — ED PROVIDER NOTES
EMERGENCY DEPARTMENT ENCOUNTER  Room Number:  05/05  PCP: Janki Mckeon APRN  Independent Historians: Patient      HPI:  Chief Complaint: had concerns including Fall and Back Pain.     A complete HPI/ROS/PMH/PSH/SH/FH are unobtainable due to: None    Chronic or social conditions impacting patient care (Social Determinants of Health): None      Context: The patient is a 24 y.o. male with a medical history of asthma who presents to the ED c/o acute low back pain ongoing for the past 3 days. Patient states that 3 days ago he was outside playing with a dog and he twisted and felt a sharp pain in his back. He describes the pain as constant aching pain with sharp/stabbing pains with movement.  He has tried alternating ibuprofen and Tylenol with minimal relief of symptoms.  Patient notes that he has pulled a muscle in his back before states this feels more severe.  Denies weakness, numbness or tingling, saddle paresthesias, loss of bowel control, urinary retention, fever, chills, nausea, vomiting.       Review of prior external notes (non-ED) -and- Review of prior external test results outside of this encounter: Review of labs from 6/1/2023 CMP with creatinine of 0.93 and CBC with hemoglobin of 15.5.  Reviewed patient's most recent encounter from 6/1/2023 and urgent care to be seen shortness of breath and left-sided chest pain.  At this encounter patient was given 325 mg of aspirin and instructed to go to the ED for further evaluation.        PAST MEDICAL HISTORY  Active Ambulatory Problems     Diagnosis Date Noted    Asthma 05/25/2022     Resolved Ambulatory Problems     Diagnosis Date Noted    No Resolved Ambulatory Problems     No Additional Past Medical History         PAST SURGICAL HISTORY  No past surgical history on file.      FAMILY HISTORY  Family History   Problem Relation Age of Onset    No Known Problems Mother     No Known Problems Father     No Known Problems Maternal Grandmother     No Known Problems  Maternal Grandfather     No Known Problems Paternal Grandmother     No Known Problems Paternal Grandfather          SOCIAL HISTORY  Social History     Socioeconomic History    Marital status:    Tobacco Use    Smoking status: Never    Smokeless tobacco: Never   Substance and Sexual Activity    Alcohol use: Yes     Comment: occ    Drug use: No         ALLERGIES  Patient has no known allergies.      REVIEW OF SYSTEMS  Review of Systems  Included in HPI  All systems reviewed and negative except for those discussed in HPI.      PHYSICAL EXAM    I have reviewed the triage vital signs and nursing notes.    ED Triage Vitals [10/29/24 0855]   Temp Heart Rate Resp BP SpO2   98 °F (36.7 °C) 92 16 -- 98 %      Temp src Heart Rate Source Patient Position BP Location FiO2 (%)   -- -- -- -- --       Physical Exam  GENERAL: alert, no acute distress  SKIN: Warm, dry  HENT: Normocephalic, atraumatic  EYES: no scleral icterus  CV: regular rhythm, regular rate  RESPIRATORY: normal effort, lungs clear  ABDOMEN: nondistended  MUSCULOSKELETAL: no deformity, no midline vertebral tenderness, no paraspinal tenderness, minimally tender to palpation on the right lower lumbar region, DP and PT pulses equal and symmetric, normal ankle dorsiflexion and extension, no foot drop  NEURO: alert, moves all extremities, follows commands,             LAB RESULTS  No results found for this or any previous visit (from the past 24 hours).      RADIOLOGY  XR Spine Lumbar Complete 4+VW    Result Date: 10/29/2024  XR SPINE LUMBAR COMPLETE 4+VW-  Clinical: Fell with back pain  FINDINGS: Lumbosacral transitional segment with prominent left transverse process with pseudoarthrosis formation to the adjacent sacral jacey. It would be called L6 for this report.  No acute compression deformity. There is subtle rounding of the anterior inferior L1 endplate which appears old. Disc base narrowing L5-L6. The posterior elements have a satisfactory appearance. No  spondylolysis nor spondylolisthesis.  CONCLUSION: Lumbosacral transitional anatomy. Disc space narrowing L5-L6. No acute compression deformity is demonstrated.  This report was finalized on 10/29/2024 10:04 AM by Dr. Jose Mena M.D on Workstation: [id number]         MEDICATIONS GIVEN IN ER  Medications   HYDROcodone-acetaminophen (NORCO) 5-325 MG per tablet 1 tablet (1 tablet Oral Given 10/29/24 0926)         ORDERS PLACED DURING THIS VISIT:  Orders Placed This Encounter   Procedures    XR Spine Lumbar Complete 4+VW         OUTPATIENT MEDICATION MANAGEMENT:  No current Epic-ordered facility-administered medications on file.     Current Outpatient Medications Ordered in Epic   Medication Sig Dispense Refill    albuterol sulfate  (90 Base) MCG/ACT inhaler INHALE 2 PUFFS EVERY 6 HOURS AS NEEDED FOR WHEEZING 6.7 g 2    Adderall XR 20 MG 24 hr capsule Take by mouth Every Morning      diclofenac (VOLTAREN) 75 MG EC tablet Take 1 tablet by mouth 2 (Two) Times a Day. With food 20 tablet 0    methocarbamol (ROBAXIN) 750 MG tablet Take 1 tablet by mouth 3 (Three) Times a Day As Needed for Muscle Spasms. 15 tablet 0         PROCEDURES  Procedures            PROGRESS, DATA ANALYSIS, CONSULTS, AND MEDICAL DECISION MAKING  All labs have been independently interpreted by me.  All radiology studies have been reviewed by me. All EKG's have been independently viewed and interpreted by me.  Discussion below represents my analysis of pertinent findings related to patient's condition, differential diagnosis, treatment plan and final disposition.    DIFFERENTIAL    My differential diagnosis includes but is not limited to fracture, strain, sprain    Clinical Scores:                       Independent interpretation of the lumbar spine x-rays is no compression fracture    I reassessed the patient, counseled him on his imaging results.  Symptoms consistent with lumbosacral strain.  Lidocaine patch applied, will prescribe muscle  relaxer and NSAID, counseled on follow-up and indications for return.  No neurologic red flags.      AS OF 16:11 EDT VITALS:    BP - 121/78  HR - 70  TEMP - 98 °F (36.7 °C)  O2 SATS - 95%    COMPLEXITY OF CARE  Admission was considered but after careful review of the patient's presentation, physical examination, diagnostic results, and response to treatment the patient may be safely discharged with outpatient follow-up.      DIAGNOSIS  Final diagnoses:   Strain of lumbar region, initial encounter   Acute right-sided low back pain without sciatica         DISPOSITION  ED Disposition       ED Disposition   Discharge    Condition   Stable    Comment   --                  FOLLOW UP  Janki Mckeon, APRN  [address] [facility] [place] [id number] [phone]    In 1 week          Prescribed Medications     Medication List        New Prescriptions      diclofenac 75 MG EC tablet  Commonly known as: VOLTAREN  Take 1 tablet by mouth 2 (Two) Times a Day. With food     methocarbamol 750 MG tablet  Commonly known as: ROBAXIN  Take 1 tablet by mouth 3 (Three) Times a Day As Needed for Muscle Spasms.               Where to Get Your Medications        These medications were sent to [facility]/pharmacy #[id number] - [place], [place] - [address] [place] [address] - [phone]  - [phone] [address] [place] [address]      Hours: 24-hours Phone: [phone]   diclofenac 75 MG EC tablet  methocarbamol 750 MG tablet                   Please note that portions of this document were completed with a voice recognition program.    Note Disclaimer: At [facility], we believe that sharing information builds trust and better relationships. You are receiving this note because you recently visited [facility]. It is possible you will see health information before a provider has talked with you about it. This kind of information can be easy to misunderstand. To help you fully understand what it means for your health, we urge  you to discuss this note with your provider.         Janki Alonso PA-C  10/29/24 1255

## 2024-10-29 NOTE — ED NOTES
Pt reports he was playing with friend's dog and had stepping into hole with left foot. Pt reports feeling lower back pain immediately. Pt and family report pt pain increases with twisting motions. Pt denies loss of bowel or bladder, numbness and tingling in extremities.

## 2024-10-29 NOTE — Clinical Note
[facility] EMERGENCY DEPARTMENT  [address] [facility] [address]  Phone: [phone]    Grady Desai was seen and treated in our emergency department on 10/29/2024.  He may return to work on 11/01/2024.         Thank you for choosing [facility].    Kathleen Huggins RN

## 2024-10-29 NOTE — Clinical Note
[facility] EMERGENCY DEPARTMENT  [address] [facility] [address]  Phone: [phone]    Grady Desai was seen and treated in our emergency department on 10/29/2024.  He may return to work on 10/30/2024.         Thank you for choosing [facility].    Janki Alonso PA-C

## 2024-10-29 NOTE — Clinical Note
[facility] EMERGENCY DEPARTMENT  [address] [facility] [address]  Phone: [phone]    iTny Desai accompanied Grady Desai to the emergency department on 10/29/2024. They may return to work on 10/30/2024.        Thank you for choosing [facility].    Kathleen Huggins, RN

## 2024-10-29 NOTE — ED PROVIDER NOTES
I supervised care provided by the midlevel provider.   We have discussed this patient's history, physical exam, and treatment plan.  I have reviewed the note and personally saw and examined the patient and agree with the plan of care.   I have seen this patient.  This is a gentleman over the weekend he was playing with the dog.  He stepped in a hole and twisted his back.  Since that time he has had very lower lumbar sacral area of pain right of midline.  Pain is worse when he moves when he does to get up from a leaning back position when he goes to stand or walk.  He has had no urinary or fecal incontinence or retention.  No saddle anesthesia.  No numbness or paralysis.  No fevers or chills.    GENERAL: not distressed  HENT: nares patent  Head/neck/ face are symmetric without gross deformity or swelling  EYES: no scleral icterus  CV: regular rhythm, regular rate with intact distal pulses  RESPIRATORY: normal effort and no respiratory distress  ABDOMEN: soft and nontender obese.  Back exam his location of his pain is the very lower lumbar sacral region of his back.  Normal inspection.  Normal saddle sensation.  MUSCULOSKELETAL: no deformity.  5/5 strength to hip flexion, knee extension/flexion, dorsiflexion, plantarflexion, and EHL.  No pain with bilateral external and internal rotation of hips.  Intact distal pulses with no cyanosis, pallor, or temperature change on palpation. Normal inspection and palpation with soft compartments.  SILT at bilateral superficial peroneal, deep peroneal, sural, and saphenous nerves  Pain is reproducible when we go to sit him up from a leaning back position or when he goes to roll over to examine him as well as raising his right or left leg.  NEURO: alert and appropriate, moves all extremities, follows commands  SKIN: warm, dry    Vital signs and nursing notes reviewed.    Plan x-rays are unremarkable.    This gentleman did not have any direct trauma.  He twisted his back at the  sounds like musculoskeletal pain.  Talked with the patient and spouse.  I did use nonsteroidals and Tylenol as well as Lidoderm patch.  I do not believe he needs any narcotic at this time.  Informed them of the results of the tests and my clinical impression.  All questions answered         Maikol Young MD  10/29/24 2273

## 2024-10-29 NOTE — DISCHARGE INSTRUCTIONS
prescriptions from pharmacy, take as prescribed.  Follow-up with primary care provider in 1 week.  Return the ED if symptoms worsen.

## 2024-10-29 NOTE — ED NOTES
Patient to ER via car from home able to walk to triage for slip and fall on Saturday with back pain    No head injury no loc no blood thinners